# Patient Record
Sex: MALE | Race: OTHER | NOT HISPANIC OR LATINO | URBAN - METROPOLITAN AREA
[De-identification: names, ages, dates, MRNs, and addresses within clinical notes are randomized per-mention and may not be internally consistent; named-entity substitution may affect disease eponyms.]

---

## 2020-02-23 ENCOUNTER — EMERGENCY (EMERGENCY)
Facility: HOSPITAL | Age: 30
LOS: 0 days | Discharge: HOME | End: 2020-02-23
Attending: EMERGENCY MEDICINE | Admitting: EMERGENCY MEDICINE
Payer: SUBSIDIZED

## 2020-02-23 VITALS
HEART RATE: 91 BPM | DIASTOLIC BLOOD PRESSURE: 60 MMHG | OXYGEN SATURATION: 98 % | HEIGHT: 71 IN | RESPIRATION RATE: 18 BRPM | WEIGHT: 191.8 LBS | TEMPERATURE: 97 F | SYSTOLIC BLOOD PRESSURE: 120 MMHG

## 2020-02-23 DIAGNOSIS — Z23 ENCOUNTER FOR IMMUNIZATION: ICD-10-CM
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
## 2020-02-23 DIAGNOSIS — S61.215A LACERATION WITHOUT FOREIGN BODY OF LEFT RING FINGER WITHOUT DAMAGE TO NAIL, INITIAL ENCOUNTER: ICD-10-CM

## 2020-02-23 DIAGNOSIS — S61.512A LACERATION WITHOUT FOREIGN BODY OF LEFT WRIST, INITIAL ENCOUNTER: ICD-10-CM
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
## 2020-02-23 PROCEDURE — 99283 EMERGENCY DEPT VISIT LOW MDM: CPT | Mod: 25

## 2020-02-23 PROCEDURE — 12001 RPR S/N/AX/GEN/TRNK 2.5CM/<: CPT

## 2020-02-23 RX ORDER — TETANUS TOXOID, REDUCED DIPHTHERIA TOXOID AND ACELLULAR PERTUSSIS VACCINE, ADSORBED 5; 2.5; 8; 8; 2.5 [IU]/.5ML; [IU]/.5ML; UG/.5ML; UG/.5ML; UG/.5ML
0.5 SUSPENSION INTRAMUSCULAR ONCE
Refills: 0 | Status: COMPLETED | OUTPATIENT
Start: 2020-02-23 | End: 2020-02-23

## 2020-02-23 RX ADMIN — TETANUS TOXOID, REDUCED DIPHTHERIA TOXOID AND ACELLULAR PERTUSSIS VACCINE, ADSORBED 0.5 MILLILITER(S): 5; 2.5; 8; 8; 2.5 SUSPENSION INTRAMUSCULAR at 14:40

## 2020-02-23 NOTE — ED ADULT NURSE NOTE - NSSEPSISSUSPECTED_ED_A_ED
-- Message is from the [facility]--    Patient is requesting a medication refill - medication is on active list.    Gabapentin - 600MG Tabl New Add as: gabapentin (NEURONTIN) 600 MG tablet.  Dose: 600 mg 1 tablet three times daily    atorvastatin (LIPITOR) 20 MG tablet On chart  TAKE 1 TABLET BY MOUTH EVERY DAY    Duloxetine DR 60MG, take 1 time a day     Cilostazol - 100MG Tablet New Add as: cilostazol (PLETAL) 100 MG tablet  Dose: 100 mg TAKE 1 TABLET TWICE DAILY.    clopidogrel (PLAVIX) 75 MG tablet TAKE 1 TABLET BY MOUTH DAILY    Lisinopril - 40MG Tabl New  Add as: lisinopril (PRINIVIL,ZESTRIL) 40 MG tablet Dose: 40mg 1 tablet daily    Metoprolol Tartrate - 25MG Tabl New  Add as: metoPROLOL tartrate (LOPRESSOR) 25 MG tablet  Dose: 25 mg 1 tablet twice daily    NovoLOG - 100UNIT/ML Solu New   Add as: insulin aspart (NOVOLOG) 100 UNIT/ML injectable solution.  Dose: 100 UNIT/ML as directed before meals    Lantus - 30UNIT/ML Solu New  Add as: insulin glargine (LANTUS) 30UNIT/ML injectable solution Dose: 30 UNIT/ML as directed    Thin insulin syringes, Well @ [facility] brand 0.3mL  31 davion 516, 8mm    Duration: 30 days    Pharmacy  [facility] [address] [place] [address] [address] [facility] [address]    Patient confirmed the above pharmacy as correct?  Yes    Caller Information       Type Contact Phone    02/21/2019 02:29 PM Phone (Incoming) Kristy Jean-Baptiste (Self) [phone] (H)        Alternative phone number: none    Turnaround time given to caller:   \"This message will be sent to [state Provider's name]. The clinical team will fulfill your request as soon as they review your message.\"   No

## 2020-02-23 NOTE — ED PROVIDER NOTE - ATTENDING CONTRIBUTION TO CARE
I was present for and supervised the key and critical aspects of the procedures performed during the care of the patient. Patient sustained a laceration to the left hand while playing soccer and falling on glass he has no pain at this time. from with no signs of tendon injury  radial pulses 2 +=    Patient had successful laceration repair to the left 4th mcp joint with no signs of tendon weakness or injury she denies any ascending weakness   in addition he has a second laceration to the medial aspect of the distal forearm with no signs of weakness tending injury or ascending infection he has from tetanus updated, I will discharge with follow up to hand clinic

## 2020-02-23 NOTE — ED PROVIDER NOTE - PHYSICAL EXAMINATION
skin: left hand 4th digit proximal aspect linear laceration superficial 2.0 cm , no foreign bodies, no active bleeding. no tendon injuries. good rom of digit  left ulnar aspect volar wrist +superficial laceration 1.0 cm no active bleeding    msk: good rom of digits. no bony tenderness. pulses distally in tact

## 2020-02-23 NOTE — ED PROVIDER NOTE - OBJECTIVE STATEMENT
28 y/o male presents to the ed s/p fall onto broken glass sustaining lacerations to left hand and wrist. patient with active bleeding from wound. no tingling to digits. no foreign bodies present. patient with laceration to wrist and laceration to left 4th digit. no other injuries. patient c/o burning pain. bleeding improved with bandage to wounds

## 2020-02-23 NOTE — ED PROVIDER NOTE - NSFOLLOWUPCLINICS_GEN_ALL_ED_FT
[facility]  Medicine  [address], [place]   Phone: [phone]  Fax:   Follow Up Time: [facility]  Medicine  [address] [place], [place]   Phone: [phone]  Fax:     [facility]  Hand  [address], [address]  [place] [address]  Phone: [phone]  Fax:   Follow Up Time:

## 2020-02-23 NOTE — ED PROVIDER NOTE - CLINICAL SUMMARY MEDICAL DECISION MAKING FREE TEXT BOX
Patient had successful laceration repair to the left 4th mcp joint with no signs of tendon weakness or injury she denies any ascending weakness   in addition he has a second laceration to the medial aspect of the distal forearm with no signs of weakness tending injury or ascending infection he has from tetanus updated, I will discharge with follow up to hand

## 2020-02-23 NOTE — ED PROVIDER NOTE - PATIENT PORTAL LINK FT
You can access the FollowMyHealth Patient Portal offered by [facility] by registering at the following website: [email or web address]. By joining [employer] [facility]’s FollowMyHealth portal, you will also be able to view your health information using other applications (apps) compatible with our system.

## 2020-02-23 NOTE — ED PROVIDER NOTE - NS ED ROS FT
Review of Systems    Constitutional: (-) fever or chills  msk: no bony tenderness   Integumentary: (+)laceration left 4th digit and wrist   Neurological: (-) altered mental status, headache or head injury

## 2020-02-23 NOTE — ED PROVIDER NOTE - NSFOLLOWUPINSTRUCTIONS_ED_ALL_ED_FT
Laceration    A laceration is a cut that goes through all of the layers of the skin and into the tissue that is right under the skin. Some lacerations heal on their own. Others need to be closed with stitches (sutures), staples, skin adhesive strips, or skin glue. Proper laceration care minimizes the risk of infection and helps the laceration to heal better.     SEEK IMMEDIATE MEDICAL CARE IF YOU HAVE THE FOLLOWING SYMPTOMS: swelling around the wound, worsening pain, drainage from the wound, red streaking going away from your wound, inability to move finger or toe near the laceration, or discoloration of skin near the laceration.      suture removal in 14 days

## 2025-05-04 NOTE — ED PROVIDER NOTE - CARE PLAN
Dr House at bedside    Principal Discharge DX:	Laceration of finger  Secondary Diagnosis:	Laceration of left wrist, initial encounter